# Patient Record
Sex: FEMALE | Race: WHITE | NOT HISPANIC OR LATINO
[De-identification: names, ages, dates, MRNs, and addresses within clinical notes are randomized per-mention and may not be internally consistent; named-entity substitution may affect disease eponyms.]

---

## 2017-09-28 ENCOUNTER — RESULT REVIEW (OUTPATIENT)
Age: 62
End: 2017-09-28

## 2021-10-25 ENCOUNTER — APPOINTMENT (OUTPATIENT)
Dept: SPINE | Facility: CLINIC | Age: 66
End: 2021-10-25
Payer: COMMERCIAL

## 2021-10-25 VITALS
BODY MASS INDEX: 27.25 KG/M2 | HEART RATE: 81 BPM | SYSTOLIC BLOOD PRESSURE: 149 MMHG | DIASTOLIC BLOOD PRESSURE: 87 MMHG | WEIGHT: 184 LBS | OXYGEN SATURATION: 96 % | HEIGHT: 69 IN

## 2021-10-25 DIAGNOSIS — Z83.2 FAMILY HISTORY OF DISEASES OF THE BLOOD AND BLOOD-FORMING ORGANS AND CERTAIN DISORDERS INVOLVING THE IMMUNE MECHANISM: ICD-10-CM

## 2021-10-25 DIAGNOSIS — Z78.9 OTHER SPECIFIED HEALTH STATUS: ICD-10-CM

## 2021-10-25 DIAGNOSIS — Z86.39 PERSONAL HISTORY OF OTHER ENDOCRINE, NUTRITIONAL AND METABOLIC DISEASE: ICD-10-CM

## 2021-10-25 DIAGNOSIS — Z82.49 FAMILY HISTORY OF ISCHEMIC HEART DISEASE AND OTHER DISEASES OF THE CIRCULATORY SYSTEM: ICD-10-CM

## 2021-10-25 DIAGNOSIS — Z87.39 PERSONAL HISTORY OF OTHER DISEASES OF THE MUSCULOSKELETAL SYSTEM AND CONNECTIVE TISSUE: ICD-10-CM

## 2021-10-25 DIAGNOSIS — Z86.59 PERSONAL HISTORY OF OTHER MENTAL AND BEHAVIORAL DISORDERS: ICD-10-CM

## 2021-10-25 DIAGNOSIS — M48.00 SPINAL STENOSIS, SITE UNSPECIFIED: ICD-10-CM

## 2021-10-25 PROCEDURE — 99204 OFFICE O/P NEW MOD 45 MIN: CPT

## 2021-10-25 RX ORDER — MULTIVITAMIN
TABLET ORAL
Refills: 0 | Status: ACTIVE | COMMUNITY

## 2021-10-25 RX ORDER — OLMESARTAN MEDOXOMIL 20 MG/1
20 TABLET, FILM COATED ORAL
Refills: 0 | Status: ACTIVE | COMMUNITY

## 2021-10-25 RX ORDER — LEVOTHYROXINE SODIUM 175 UG/1
175 TABLET ORAL
Refills: 0 | Status: ACTIVE | COMMUNITY

## 2021-10-26 NOTE — REVIEW OF SYSTEMS
[Difficulty Walking] : difficulty walking [Negative] : Heme/Lymph [de-identified] : foot weakness

## 2021-10-26 NOTE — HISTORY OF PRESENT ILLNESS
[< 3 months] : less than 3 months [FreeTextEntry1] : Right foot drop  [de-identified] : Ms Villavicencio is a 66-year-old woman who is seen today for her first neurosurgical visit for a right foot drop.  She has mild pain in her right foot and pain in the back and down right leg for five weeks .  Her pain is a  5/10.    She was seen in consultation by Dr Shah and scheduled lumbar surgery.  She is here for a second opinion and surgical consult.  She has no urine or bowel dysfunction.  She is wearing a foot brace on the right side.  The right foot weakness has been essentially stable for 5 weeks. An MRI scan of the lumbar spine shows L3-4 stenosis at L4-5 stenosis with an L4-5 grade 1 spondylolisthesis. There are no significant left leg symptoms and minimal low back pain.

## 2021-10-26 NOTE — END OF VISIT
[FreeTextEntry3] : I, Dr. Garry Sharp, evaluated this patient with the Nurse Practitioner, Kim Lombardo, and established the plan of care.  I personally discussed this patient  during the key portions of the history and exam with the Nurse Practitioner at the time of the visit.  I agree with the assessment and plan as written, unless noted below.\par

## 2021-10-26 NOTE — ASSESSMENT
[FreeTextEntry1] : \par Partial Right foot drop and  lumbar stenosis  and spondylolisthesis at L 3 L4 , L 4 L 5 .  Start PT and pain management.  If her foot drop becomes worse she will contact the office .  Surgery is reasonable to perform in the near future.   In two to four weeks she will return for re-evaluation.  I have examined and evaluated this patient along with the nurse practitioner and we agreed on her plan of care.\par \par   \par \par \par \par CC:\par Mimi Harris MD\par [facility]\par [address] \[place] [address]\par \par Analisa Morris MD\[facility] [address]\[place] [address]

## 2021-10-26 NOTE — CONSULT LETTER
[Dear  ___] : Dear  [unfilled], [Consult Letter:] : I had the pleasure of evaluating your patient, [unfilled]. [Please see my note below.] : Please see my note below. [Consult Closing:] : Thank you very much for allowing me to participate in the care of this patient.  If you have any questions, please do not hesitate to contact me. [Sincerely,] : Sincerely, [FreeTextEntry3] : Garry Sharp MD\par Chief of Neurosurgery [facility]\par [facility]\par

## 2021-10-26 NOTE — DATA REVIEWED
[de-identified] : Lumbar MRI from [facility] 10/12/2021

## 2021-10-26 NOTE — REASON FOR VISIT
[New Patient Visit] : a new patient visit [Referred By: _________] : Patient was referred by [facility] [Other: _____] : [unfilled] [FreeTextEntry1] : Right foot drop

## 2021-10-26 NOTE — PHYSICAL EXAM
[Person] : oriented to person [Place] : oriented to place [Time] : oriented to time [Short Term Intact] : short term memory intact [Remote Intact] : remote memory intact [Span Intact] : the attention span was normal [Concentration Intact] : normal concentrating ability [Fluency] : fluency intact [Comprehension] : comprehension intact [Current Events] : adequate knowledge of current events [Past History] : adequate knowledge of personal past history [Vocabulary] : adequate range of vocabulary [Cranial Nerves Optic (II)] : visual acuity intact bilaterally,  pupils equal round and reactive to light [Cranial Nerves Oculomotor (III)] : extraocular motion intact [Cranial Nerves Trigeminal (V)] : facial sensation intact symmetrically [Cranial Nerves Facial (VII)] : face symmetrical [Cranial Nerves Vestibulocochlear (VIII)] : hearing was intact bilaterally [Cranial Nerves Glossopharyngeal (IX)] : tongue and palate midline [Cranial Nerves Accessory (XI - Cranial And Spinal)] : head turning and shoulder shrug symmetric [Cranial Nerves Hypoglossal (XII)] : there was no tongue deviation with protrusion [Motor Tone] : muscle tone was normal in all four extremities [No Muscle Atrophy] : normal bulk in all four extremities [Sensation Tactile Decrease] : light touch was intact [Abnormal Walk] : normal gait [Balance] : balance was intact [2+] : Ankle jerk left 2+ [No Tenderness to Palpation] : no spine tenderness on palpation [Able to heel walk] : the patient was able to heel walk [Past-pointing] : there was no past-pointing [Tremor] : no tremor present [Plantar Reflex Right Only] : normal on the right [Plantar Reflex Left Only] : normal on the left [Holland] : Holland's sign was not demonstrated [FreeTextEntry6] : right EHL and dorsiflexion 4/5 [Straight-Leg Raise Test - Left] : straight leg raise of the left leg was negative [Straight-Leg Raise Test - Right] : straight leg raise  of the right leg was negative [Able to toe walk] : the patient was not able to toe walk

## 2022-10-14 ENCOUNTER — APPOINTMENT (OUTPATIENT)
Dept: ULTRASOUND IMAGING | Facility: CLINIC | Age: 67
End: 2022-10-14

## 2022-10-14 ENCOUNTER — APPOINTMENT (OUTPATIENT)
Dept: MAMMOGRAPHY | Facility: CLINIC | Age: 67
End: 2022-10-14

## 2022-10-14 PROCEDURE — 77063 BREAST TOMOSYNTHESIS BI: CPT

## 2022-10-14 PROCEDURE — 77067 SCR MAMMO BI INCL CAD: CPT

## 2022-10-14 PROCEDURE — 76641 ULTRASOUND BREAST COMPLETE: CPT | Mod: 50

## 2023-05-06 ENCOUNTER — NON-APPOINTMENT (OUTPATIENT)
Age: 68
End: 2023-05-06